# Patient Record
Sex: FEMALE | Race: WHITE | NOT HISPANIC OR LATINO | Employment: UNEMPLOYED | URBAN - METROPOLITAN AREA
[De-identification: names, ages, dates, MRNs, and addresses within clinical notes are randomized per-mention and may not be internally consistent; named-entity substitution may affect disease eponyms.]

---

## 2021-03-23 ENCOUNTER — HOSPITAL ENCOUNTER (EMERGENCY)
Facility: MEDICAL CENTER | Age: 61
End: 2021-03-23
Attending: EMERGENCY MEDICINE

## 2021-03-23 ENCOUNTER — APPOINTMENT (OUTPATIENT)
Dept: RADIOLOGY | Facility: MEDICAL CENTER | Age: 61
End: 2021-03-23
Attending: EMERGENCY MEDICINE

## 2021-03-23 VITALS
HEIGHT: 64 IN | SYSTOLIC BLOOD PRESSURE: 138 MMHG | WEIGHT: 128.31 LBS | BODY MASS INDEX: 21.91 KG/M2 | HEART RATE: 66 BPM | RESPIRATION RATE: 17 BRPM | TEMPERATURE: 97.9 F | DIASTOLIC BLOOD PRESSURE: 74 MMHG | OXYGEN SATURATION: 98 %

## 2021-03-23 DIAGNOSIS — K83.8 COMMON BILE DUCT DILATATION: ICD-10-CM

## 2021-03-23 DIAGNOSIS — R10.12 ABDOMINAL PAIN, BILATERAL UPPER QUADRANT: ICD-10-CM

## 2021-03-23 DIAGNOSIS — R10.11 ABDOMINAL PAIN, BILATERAL UPPER QUADRANT: ICD-10-CM

## 2021-03-23 LAB
ALBUMIN SERPL BCP-MCNC: 4.3 G/DL (ref 3.2–4.9)
ALBUMIN/GLOB SERPL: 1.5 G/DL
ALP SERPL-CCNC: 43 U/L (ref 30–99)
ALT SERPL-CCNC: 18 U/L (ref 2–50)
ANION GAP SERPL CALC-SCNC: 10 MMOL/L (ref 7–16)
APPEARANCE UR: CLEAR
AST SERPL-CCNC: 20 U/L (ref 12–45)
BASOPHILS # BLD AUTO: 0.5 % (ref 0–1.8)
BASOPHILS # BLD: 0.03 K/UL (ref 0–0.12)
BILIRUB SERPL-MCNC: 0.3 MG/DL (ref 0.1–1.5)
BILIRUB UR QL STRIP.AUTO: NEGATIVE
BUN SERPL-MCNC: 12 MG/DL (ref 8–22)
CALCIUM SERPL-MCNC: 10.7 MG/DL (ref 8.5–10.5)
CHLORIDE SERPL-SCNC: 103 MMOL/L (ref 96–112)
CO2 SERPL-SCNC: 24 MMOL/L (ref 20–33)
COLOR UR: YELLOW
CREAT SERPL-MCNC: 0.7 MG/DL (ref 0.5–1.4)
EKG IMPRESSION: NORMAL
EOSINOPHIL # BLD AUTO: 0.03 K/UL (ref 0–0.51)
EOSINOPHIL NFR BLD: 0.5 % (ref 0–6.9)
ERYTHROCYTE [DISTWIDTH] IN BLOOD BY AUTOMATED COUNT: 41.8 FL (ref 35.9–50)
GLOBULIN SER CALC-MCNC: 2.8 G/DL (ref 1.9–3.5)
GLUCOSE SERPL-MCNC: 106 MG/DL (ref 65–99)
GLUCOSE UR STRIP.AUTO-MCNC: NEGATIVE MG/DL
HCT VFR BLD AUTO: 45.5 % (ref 37–47)
HGB BLD-MCNC: 15.9 G/DL (ref 12–16)
IMM GRANULOCYTES # BLD AUTO: 0.02 K/UL (ref 0–0.11)
IMM GRANULOCYTES NFR BLD AUTO: 0.3 % (ref 0–0.9)
KETONES UR STRIP.AUTO-MCNC: NEGATIVE MG/DL
LACTATE BLD-SCNC: 1 MMOL/L (ref 0.5–2)
LEUKOCYTE ESTERASE UR QL STRIP.AUTO: NEGATIVE
LIPASE SERPL-CCNC: 31 U/L (ref 11–82)
LYMPHOCYTES # BLD AUTO: 1.84 K/UL (ref 1–4.8)
LYMPHOCYTES NFR BLD: 29.4 % (ref 22–41)
MCH RBC QN AUTO: 33.4 PG (ref 27–33)
MCHC RBC AUTO-ENTMCNC: 34.9 G/DL (ref 33.6–35)
MCV RBC AUTO: 95.6 FL (ref 81.4–97.8)
MICRO URNS: ABNORMAL
MONOCYTES # BLD AUTO: 0.58 K/UL (ref 0–0.85)
MONOCYTES NFR BLD AUTO: 9.3 % (ref 0–13.4)
NEUTROPHILS # BLD AUTO: 3.76 K/UL (ref 2–7.15)
NEUTROPHILS NFR BLD: 60 % (ref 44–72)
NITRITE UR QL STRIP.AUTO: NEGATIVE
NRBC # BLD AUTO: 0 K/UL
NRBC BLD-RTO: 0 /100 WBC
PH UR STRIP.AUTO: >=9 [PH] (ref 5–8)
PLATELET # BLD AUTO: 296 K/UL (ref 164–446)
PMV BLD AUTO: 10.1 FL (ref 9–12.9)
POTASSIUM SERPL-SCNC: 3.6 MMOL/L (ref 3.6–5.5)
PROT SERPL-MCNC: 7.1 G/DL (ref 6–8.2)
PROT UR QL STRIP: NEGATIVE MG/DL
RBC # BLD AUTO: 4.76 M/UL (ref 4.2–5.4)
RBC UR QL AUTO: NEGATIVE
SODIUM SERPL-SCNC: 137 MMOL/L (ref 135–145)
SP GR UR STRIP.AUTO: 1.01
UROBILINOGEN UR STRIP.AUTO-MCNC: 0.2 MG/DL
WBC # BLD AUTO: 6.3 K/UL (ref 4.8–10.8)

## 2021-03-23 PROCEDURE — 93005 ELECTROCARDIOGRAM TRACING: CPT | Performed by: EMERGENCY MEDICINE

## 2021-03-23 PROCEDURE — 83690 ASSAY OF LIPASE: CPT

## 2021-03-23 PROCEDURE — C9113 INJ PANTOPRAZOLE SODIUM, VIA: HCPCS | Performed by: EMERGENCY MEDICINE

## 2021-03-23 PROCEDURE — 700111 HCHG RX REV CODE 636 W/ 250 OVERRIDE (IP): Performed by: EMERGENCY MEDICINE

## 2021-03-23 PROCEDURE — 96374 THER/PROPH/DIAG INJ IV PUSH: CPT

## 2021-03-23 PROCEDURE — 700117 HCHG RX CONTRAST REV CODE 255: Performed by: EMERGENCY MEDICINE

## 2021-03-23 PROCEDURE — 99285 EMERGENCY DEPT VISIT HI MDM: CPT

## 2021-03-23 PROCEDURE — 83605 ASSAY OF LACTIC ACID: CPT

## 2021-03-23 PROCEDURE — 85025 COMPLETE CBC W/AUTO DIFF WBC: CPT

## 2021-03-23 PROCEDURE — 74177 CT ABD & PELVIS W/CONTRAST: CPT

## 2021-03-23 PROCEDURE — 80053 COMPREHEN METABOLIC PANEL: CPT

## 2021-03-23 PROCEDURE — 81003 URINALYSIS AUTO W/O SCOPE: CPT

## 2021-03-23 PROCEDURE — 36415 COLL VENOUS BLD VENIPUNCTURE: CPT

## 2021-03-23 RX ORDER — PANTOPRAZOLE SODIUM 20 MG/1
20 TABLET, DELAYED RELEASE ORAL DAILY
Qty: 30 TABLET | Refills: 1 | Status: SHIPPED | OUTPATIENT
Start: 2021-03-23 | End: 2022-10-06

## 2021-03-23 RX ORDER — IBUPROFEN 200 MG
200 TABLET ORAL EVERY 6 HOURS PRN
Status: SHIPPED | COMMUNITY
End: 2022-10-06

## 2021-03-23 RX ORDER — PANTOPRAZOLE SODIUM 40 MG/10ML
40 INJECTION, POWDER, LYOPHILIZED, FOR SOLUTION INTRAVENOUS ONCE
Status: COMPLETED | OUTPATIENT
Start: 2021-03-23 | End: 2021-03-23

## 2021-03-23 RX ADMIN — IOHEXOL 100 ML: 350 INJECTION, SOLUTION INTRAVENOUS at 19:05

## 2021-03-23 RX ADMIN — PANTOPRAZOLE SODIUM 40 MG: 40 INJECTION, POWDER, FOR SOLUTION INTRAVENOUS at 20:19

## 2021-03-23 NOTE — ED TRIAGE NOTES
"Bert Duncan   60 y.o. female   Patient presents with:  Chief Complaint   Patient presents with   • Abdominal Pain   • Back Pain     Pt wheel chaired to triage for above complaint.     Patient has had ABD and back pain for approx 6 months.  Pain comes and goes, and with progressively worse.  Food triggers symptoms as well as laying down.  Pain described as \"fire or heart burn in stomach and into my chest\"  \"It feels like everything is tight and my back is gonna break\"     Pt is alert and oriented, speaking in full sentences, follows commands and responds appropriately to questions. NAD. Resp are even and unlabored.     Pt placed in waiting room. Pt educated on triage process. Pt encouraged to alert staff for any changes.    Patient and staff wearing appropriate PPE    /70   Pulse 61   Temp 36.6 °C (97.9 °F) (Temporal)   Resp 16   Ht 1.626 m (5' 4\")   Wt 58.2 kg (128 lb 4.9 oz)   SpO2 98%   BMI 22.02 kg/m²     "

## 2021-03-24 NOTE — ED PROVIDER NOTES
ED Provider Note    ED Provider Note    Scribed for Fran Robles MD by Fran Robles M.D.. 3/23/2021, 6:12 PM.    Primary care provider: No primary care provider on file.  Means of arrival: Private  History obtained from: Patient  History limited by: None    CHIEF COMPLAINT  Chief Complaint   Patient presents with   • Abdominal Pain   • Back Pain       HPI  Bert Duncan is a 60 y.o. female who presents to the Emergency Department for evaluation of abdominal discomfort.  Patient admits this has been going on chronically, at least the last 6 months.  However has been a lot worse for the last 4 weeks.  She notes pain is burning in character, localized to the bilateral upper quadrants with radiation to the back.  She notes it seems worse at night and worse after she eats a meal.  No particular foods seem to affect things differently.  She notes occasional nausea and rare emesis but no vomiting today.  No dysuria, no hematuria, no fever.  She has no diarrhea, and no constipation.  She admits she does not regularly see a doctor because symptoms were getting worse she decided was time to be assessed.  She used to drink alcohol but stopped doing so in January, she does smoke marijuana daily basis but notes no cigarette use for quite some time.    REVIEW OF SYSTEMS  Pertinent positives include upper abdominal pain radiation to the back more so with eating. Pertinent negatives include no fever, no lower abdominal pain, no dyspnea.  All other systems reviewed and negative.    PAST MEDICAL HISTORY   Marijuana use, former tobacco and former alcohol use    SURGICAL HISTORY   has a past surgical history that includes gyn surgery and tonsillectomy.    SOCIAL HISTORY  Social History     Tobacco Use   • Smoking status: Never Smoker   • Smokeless tobacco: Never Used   Substance Use Topics   • Alcohol use: Never   • Drug use: Yes     Types: Inhaled     Comment: marijuana      Social History     Substance and  "Sexual Activity   Drug Use Yes   • Types: Inhaled    Comment: marijuana       FAMILY HISTORY  Family History   Family history unknown: Yes       CURRENT MEDICATIONS  Home Medications     Reviewed by Messi Hoang R.N. (Registered Nurse) on 03/23/21 at 1643  Med List Status: Partial   Medication Last Dose Status   ibuprofen (MOTRIN) 200 MG Tab 3/23/2021 Active                ALLERGIES  No Known Allergies    PHYSICAL EXAM  VITAL SIGNS: /74   Pulse 66   Temp 36.6 °C (97.9 °F) (Temporal)   Resp 17   Ht 1.626 m (5' 4\")   Wt 58.2 kg (128 lb 4.9 oz)   SpO2 98%   BMI 22.02 kg/m²     General: Alert, no acute distress  Skin: Warm, dry, normal for ethnicity  Head: Normocephalic, atraumatic  Neck: Trachea midline, no tenderness  Eye: PERRL, normal conjunctiva, sclera anicteric.  ENMT: Oral mucosa moist, no pharyngeal erythema or exudate  Cardiovascular: Regular rate and rhythm, No murmur, Normal peripheral perfusion  Respiratory: Lungs CTA, respirations are non-labored, breath sounds are equal  Gastrointestinal: Soft, nontender, non distended  Musculoskeletal: No swelling, no deformity.  Paraspinous and midline tenderness approximating the lower thoracic region, T10-T12, no step-off.  No CVA tenderness.  Neurological: Alert and oriented to person, place, time, and situation  Lymphatics: No lymphadenopathy  Psychiatric: Cooperative, appropriate mood & affect      DIAGNOSTIC STUDIES/PROCEDURES    LABS  Results for orders placed or performed during the hospital encounter of 03/23/21   CBC WITH DIFFERENTIAL   Result Value Ref Range    WBC 6.3 4.8 - 10.8 K/uL    RBC 4.76 4.20 - 5.40 M/uL    Hemoglobin 15.9 12.0 - 16.0 g/dL    Hematocrit 45.5 37.0 - 47.0 %    MCV 95.6 81.4 - 97.8 fL    MCH 33.4 (H) 27.0 - 33.0 pg    MCHC 34.9 33.6 - 35.0 g/dL    RDW 41.8 35.9 - 50.0 fL    Platelet Count 296 164 - 446 K/uL    MPV 10.1 9.0 - 12.9 fL    Neutrophils-Polys 60.00 44.00 - 72.00 %    Lymphocytes 29.40 22.00 - " 41.00 %    Monocytes 9.30 0.00 - 13.40 %    Eosinophils 0.50 0.00 - 6.90 %    Basophils 0.50 0.00 - 1.80 %    Immature Granulocytes 0.30 0.00 - 0.90 %    Nucleated RBC 0.00 /100 WBC    Neutrophils (Absolute) 3.76 2.00 - 7.15 K/uL    Lymphs (Absolute) 1.84 1.00 - 4.80 K/uL    Monos (Absolute) 0.58 0.00 - 0.85 K/uL    Eos (Absolute) 0.03 0.00 - 0.51 K/uL    Baso (Absolute) 0.03 0.00 - 0.12 K/uL    Immature Granulocytes (abs) 0.02 0.00 - 0.11 K/uL    NRBC (Absolute) 0.00 K/uL   COMP METABOLIC PANEL   Result Value Ref Range    Sodium 137 135 - 145 mmol/L    Potassium 3.6 3.6 - 5.5 mmol/L    Chloride 103 96 - 112 mmol/L    Co2 24 20 - 33 mmol/L    Anion Gap 10.0 7.0 - 16.0    Glucose 106 (H) 65 - 99 mg/dL    Bun 12 8 - 22 mg/dL    Creatinine 0.70 0.50 - 1.40 mg/dL    Calcium 10.7 (H) 8.5 - 10.5 mg/dL    AST(SGOT) 20 12 - 45 U/L    ALT(SGPT) 18 2 - 50 U/L    Alkaline Phosphatase 43 30 - 99 U/L    Total Bilirubin 0.3 0.1 - 1.5 mg/dL    Albumin 4.3 3.2 - 4.9 g/dL    Total Protein 7.1 6.0 - 8.2 g/dL    Globulin 2.8 1.9 - 3.5 g/dL    A-G Ratio 1.5 g/dL   LIPASE   Result Value Ref Range    Lipase 31 11 - 82 U/L   URINALYSIS    Specimen: Urine   Result Value Ref Range    Color Yellow     Character Clear     Specific Gravity 1.012 <1.035    Ph >=9.0 (A) 5.0 - 8.0    Glucose Negative Negative mg/dL    Ketones Negative Negative mg/dL    Protein Negative Negative mg/dL    Bilirubin Negative Negative    Urobilinogen, Urine 0.2 Negative    Nitrite Negative Negative    Leukocyte Esterase Negative Negative    Occult Blood Negative Negative    Micro Urine Req see below    ESTIMATED GFR   Result Value Ref Range    GFR If African American >60 >60 mL/min/1.73 m 2    GFR If Non African American >60 >60 mL/min/1.73 m 2   LACTIC ACID   Result Value Ref Range    Lactic Acid 1.0 0.5 - 2.0 mmol/L   EKG (NOW)   Result Value Ref Range    Report       [facility] Emergency Dept.    Test Date:  2021-03-23  Pt Name:    PLACIDO MARIE                  Department: ER  MRN:        [id number]                      Room:       [facility]  Gender:     Female                       Technician: [id number]  :        1960                   Requested By:KVNG COLE  Order #:    [id number]                    Reading MD:    Measurements  Intervals                                Axis  Rate:       67                           P:          -6  NV:         135                          QRS:        89  QRSD:       94                           T:          10  QT:         432  QTc:        456    Interpretive Statements  SINUS RHYTHM  BORDERLINE RIGHT AXIS DEVIATION  No previous ECG available for comparison       All labs reviewed by me.    EKG  12 Lead EKG obtained at 1939 and interpreted by me to show:  Rhythm: Normal sinus rhythm   Rate: 67  Axis: Normal  Intervals: Normal  Q Waves: Normal  No diagnostic ST segment elevation    Clinical Impression: Normal EKG  Compared to none available    RADIOLOGY  CT-ABDOMEN-PELVIS WITH   Final Result      Intrahepatic and extra hepatic biliary duct dilatation without obvious pancreas mass could be due to occult mass or perhaps stricture. Consider further evaluation with pancreas MRI or endoscopic ultrasound.      Aortic atherosclerosis without aneurysm.        The radiologist's interpretation of all radiological studies have been reviewed by me.    COURSE & MEDICAL DECISION MAKING  Pertinent Labs & Imaging studies reviewed. (See chart for details)    6:12 PM - Patient seen and examined at bedside. Patient declines analgesia initially. Ordered metabolic work-up as well as CT imaging abdomen pelvis to evaluate her symptoms. The differential diagnoses include but are not limited to: Gastritis, peptic ulcer disease, symptomatic cholelithiasis    : Patient reassessed, in no carolina distress at this time.  I have updated with labs thus far, we are awaiting CT imaging for further evaluation.    : Patient reassessed, in no  "acute distress, relieved here of otherwise unremarkable studies.  Given dilated intrahepatic and naturopathic biliary duct I do recommend close follow-up with gastroenterology.  There is no evidence on the CT of mass but I did note to her this is a consideration.  Given chronicity of symptoms she is comfortable with outpatient follow-up.    Patient Vitals for the past 24 hrs:   BP Temp Temp src Pulse Resp SpO2 Height Weight   03/23/21 2004 138/74 -- -- 66 17 98 % -- --   03/23/21 1641 -- -- -- -- -- -- 1.626 m (5' 4\") 58.2 kg (128 lb 4.9 oz)   03/23/21 1620 132/70 36.6 °C (97.9 °F) Temporal 61 16 98 % 1.626 m (5' 4\") --         Decision Making:  This is a 60 y.o. year old female who presents with abdominal pain for several months.  She notes it has been worse for the last 4 weeks.  She is actually nontender and well-appearing on my exam, there is some mild tenderness to the back where she notes the pain seems to radiate but she has no CVA tenderness.  Unclear etiology of symptoms, given her age and risk factors including former alcohol and former tobacco use I do feel CT imaging is indicated to evaluate further.  CT thankfully is unremarkable, labs are unremarkable.  There is evidence of intra and extrahepatic biliary duct dilatation but no carolina mass noted on CT.  Radiology recommends further evaluation.  No indication for inpatient management, as such she will be referred to GI to be evaluated further.  Otherwise I suspect perhaps peptic ulcer disease, have treated with a written for a proton pump inhibitor.    The patient will return for new or worsening symptoms and is stable at the time of discharge.    Patient has had high blood pressure while in the emergency department, felt likely secondary to medical condition. Counseled patient to monitor blood pressure at home and follow up with primary care physician.     DISPOSITION:  Patient will be discharged home in stable condition.    FOLLOW UP:  [facility] - [address] [id number] [phone]  Schedule an appointment as soon as possible for a visit         OUTPATIENT MEDICATIONS:  Discharge Medication List as of 3/23/2021  8:12 PM      START taking these medications    Details   pantoprazole (PROTONIX) 20 MG tablet Take 1 tablet by mouth every day., Disp-30 tablet, R-1, Print Rx Paper               FINAL IMPRESSION  1. Abdominal pain, bilateral upper quadrant    2. Common bile duct dilatation          Fran FRANK M.D. (Ning), am scribing for, and in the presence of, Fran Robles MD.    Electronically signed by: Fran Robles M.D. (Ning), 3/23/2021    IFran MD personally performed the services described in this documentation, as scribed by Fran Robles M.D. in my presence, and it is both accurate and complete    The note accurately reflects work and decisions made by me.  Fran Robles M.D.  3/23/2021  8:30 PM

## 2021-03-24 NOTE — ED NOTES
Pt given discharge instructions, iv removed. Given prescription for Protonix. Pt ambulatory to [facility].

## 2022-10-06 ENCOUNTER — HOSPITAL ENCOUNTER (EMERGENCY)
Facility: MEDICAL CENTER | Age: 62
End: 2022-10-06
Attending: EMERGENCY MEDICINE

## 2022-10-06 ENCOUNTER — OFFICE VISIT (OUTPATIENT)
Dept: URGENT CARE | Facility: CLINIC | Age: 62
End: 2022-10-06

## 2022-10-06 ENCOUNTER — APPOINTMENT (OUTPATIENT)
Dept: RADIOLOGY | Facility: MEDICAL CENTER | Age: 62
End: 2022-10-06
Attending: EMERGENCY MEDICINE

## 2022-10-06 ENCOUNTER — APPOINTMENT (OUTPATIENT)
Dept: RADIOLOGY | Facility: MEDICAL CENTER | Age: 62
End: 2022-10-06

## 2022-10-06 VITALS
TEMPERATURE: 97.7 F | HEART RATE: 72 BPM | HEIGHT: 64 IN | DIASTOLIC BLOOD PRESSURE: 63 MMHG | WEIGHT: 130 LBS | RESPIRATION RATE: 16 BRPM | OXYGEN SATURATION: 95 % | SYSTOLIC BLOOD PRESSURE: 162 MMHG | BODY MASS INDEX: 22.2 KG/M2

## 2022-10-06 VITALS
WEIGHT: 131.6 LBS | TEMPERATURE: 98.2 F | HEART RATE: 76 BPM | RESPIRATION RATE: 16 BRPM | HEIGHT: 64 IN | DIASTOLIC BLOOD PRESSURE: 70 MMHG | SYSTOLIC BLOOD PRESSURE: 100 MMHG | OXYGEN SATURATION: 97 % | BODY MASS INDEX: 22.47 KG/M2

## 2022-10-06 DIAGNOSIS — R06.02 SOB (SHORTNESS OF BREATH): ICD-10-CM

## 2022-10-06 DIAGNOSIS — U07.1 COVID-19: ICD-10-CM

## 2022-10-06 DIAGNOSIS — I48.91 ATRIAL FIBRILLATION, UNSPECIFIED TYPE (HCC): ICD-10-CM

## 2022-10-06 DIAGNOSIS — F41.9 ANXIETY: ICD-10-CM

## 2022-10-06 LAB
ALBUMIN SERPL BCP-MCNC: 4.6 G/DL (ref 3.2–4.9)
ALBUMIN/GLOB SERPL: 1.6 G/DL
ALP SERPL-CCNC: 67 U/L (ref 30–99)
ALT SERPL-CCNC: 32 U/L (ref 2–50)
ANION GAP SERPL CALC-SCNC: 17 MMOL/L (ref 7–16)
AST SERPL-CCNC: 60 U/L (ref 12–45)
BASOPHILS # BLD AUTO: 0.4 % (ref 0–1.8)
BASOPHILS # BLD: 0.02 K/UL (ref 0–0.12)
BILIRUB SERPL-MCNC: 0.3 MG/DL (ref 0.1–1.5)
BUN SERPL-MCNC: 14 MG/DL (ref 8–22)
CALCIUM SERPL-MCNC: 9.2 MG/DL (ref 8.5–10.5)
CHLORIDE SERPL-SCNC: 98 MMOL/L (ref 96–112)
CO2 SERPL-SCNC: 18 MMOL/L (ref 20–33)
CREAT SERPL-MCNC: 0.84 MG/DL (ref 0.5–1.4)
D DIMER PPP IA.FEU-MCNC: 0.71 UG/ML (FEU) (ref 0–0.5)
EKG IMPRESSION: NORMAL
EOSINOPHIL # BLD AUTO: 0 K/UL (ref 0–0.51)
EOSINOPHIL NFR BLD: 0 % (ref 0–6.9)
ERYTHROCYTE [DISTWIDTH] IN BLOOD BY AUTOMATED COUNT: 45.1 FL (ref 35.9–50)
FLUAV RNA SPEC QL NAA+PROBE: NEGATIVE
FLUBV RNA SPEC QL NAA+PROBE: NEGATIVE
GFR SERPLBLD CREATININE-BSD FMLA CKD-EPI: 78 ML/MIN/1.73 M 2
GLOBULIN SER CALC-MCNC: 2.8 G/DL (ref 1.9–3.5)
GLUCOSE SERPL-MCNC: 129 MG/DL (ref 65–99)
HCT VFR BLD AUTO: 48.4 % (ref 37–47)
HGB BLD-MCNC: 18 G/DL (ref 12–16)
IMM GRANULOCYTES # BLD AUTO: 0.01 K/UL (ref 0–0.11)
IMM GRANULOCYTES NFR BLD AUTO: 0.2 % (ref 0–0.9)
LYMPHOCYTES # BLD AUTO: 2.67 K/UL (ref 1–4.8)
LYMPHOCYTES NFR BLD: 51.9 % (ref 22–41)
MCH RBC QN AUTO: 34.1 PG (ref 27–33)
MCHC RBC AUTO-ENTMCNC: 37.2 G/DL (ref 33.6–35)
MCV RBC AUTO: 91.7 FL (ref 81.4–97.8)
MONOCYTES # BLD AUTO: 0.87 K/UL (ref 0–0.85)
MONOCYTES NFR BLD AUTO: 16.9 % (ref 0–13.4)
NEUTROPHILS # BLD AUTO: 1.57 K/UL (ref 2–7.15)
NEUTROPHILS NFR BLD: 30.6 % (ref 44–72)
NRBC # BLD AUTO: 0 K/UL
NRBC BLD-RTO: 0 /100 WBC
PLATELET # BLD AUTO: 221 K/UL (ref 164–446)
PMV BLD AUTO: 9.5 FL (ref 9–12.9)
POTASSIUM SERPL-SCNC: 4.3 MMOL/L (ref 3.6–5.5)
PROT SERPL-MCNC: 7.4 G/DL (ref 6–8.2)
RBC # BLD AUTO: 5.28 M/UL (ref 4.2–5.4)
RSV RNA SPEC QL NAA+PROBE: NEGATIVE
SARS-COV-2 RNA RESP QL NAA+PROBE: DETECTED
SODIUM SERPL-SCNC: 133 MMOL/L (ref 135–145)
SPECIMEN SOURCE: ABNORMAL
T4 FREE SERPL-MCNC: 1.05 NG/DL (ref 0.93–1.7)
TROPONIN T SERPL-MCNC: 8 NG/L (ref 6–19)
TSH SERPL DL<=0.005 MIU/L-ACNC: 27.1 UIU/ML (ref 0.38–5.33)
WBC # BLD AUTO: 5.1 K/UL (ref 4.8–10.8)

## 2022-10-06 PROCEDURE — C9803 HOPD COVID-19 SPEC COLLECT: HCPCS | Performed by: EMERGENCY MEDICINE

## 2022-10-06 PROCEDURE — 99285 EMERGENCY DEPT VISIT HI MDM: CPT

## 2022-10-06 PROCEDURE — 84439 ASSAY OF FREE THYROXINE: CPT

## 2022-10-06 PROCEDURE — 85025 COMPLETE CBC W/AUTO DIFF WBC: CPT

## 2022-10-06 PROCEDURE — 700111 HCHG RX REV CODE 636 W/ 250 OVERRIDE (IP)

## 2022-10-06 PROCEDURE — 93005 ELECTROCARDIOGRAM TRACING: CPT | Performed by: EMERGENCY MEDICINE

## 2022-10-06 PROCEDURE — 71045 X-RAY EXAM CHEST 1 VIEW: CPT

## 2022-10-06 PROCEDURE — 85379 FIBRIN DEGRADATION QUANT: CPT

## 2022-10-06 PROCEDURE — 36415 COLL VENOUS BLD VENIPUNCTURE: CPT

## 2022-10-06 PROCEDURE — 71275 CT ANGIOGRAPHY CHEST: CPT

## 2022-10-06 PROCEDURE — 700117 HCHG RX CONTRAST REV CODE 255: Performed by: EMERGENCY MEDICINE

## 2022-10-06 PROCEDURE — 84484 ASSAY OF TROPONIN QUANT: CPT

## 2022-10-06 PROCEDURE — 84443 ASSAY THYROID STIM HORMONE: CPT

## 2022-10-06 PROCEDURE — 96374 THER/PROPH/DIAG INJ IV PUSH: CPT

## 2022-10-06 PROCEDURE — 0241U HCHG SARS-COV-2 COVID-19 NFCT DS RESP RNA 4 TRGT MIC: CPT

## 2022-10-06 PROCEDURE — 80053 COMPREHEN METABOLIC PANEL: CPT

## 2022-10-06 PROCEDURE — 99205 OFFICE O/P NEW HI 60 MIN: CPT | Performed by: NURSE PRACTITIONER

## 2022-10-06 PROCEDURE — 94760 N-INVAS EAR/PLS OXIMETRY 1: CPT

## 2022-10-06 PROCEDURE — 93000 ELECTROCARDIOGRAM COMPLETE: CPT | Performed by: NURSE PRACTITIONER

## 2022-10-06 PROCEDURE — 93005 ELECTROCARDIOGRAM TRACING: CPT

## 2022-10-06 RX ORDER — LORAZEPAM 2 MG/ML
INJECTION INTRAMUSCULAR
Status: COMPLETED
Start: 2022-10-06 | End: 2022-10-06

## 2022-10-06 RX ORDER — LORAZEPAM 0.5 MG/1
0.5 TABLET ORAL EVERY 8 HOURS PRN
Qty: 6 TABLET | Refills: 0 | Status: SHIPPED | OUTPATIENT
Start: 2022-10-06 | End: 2022-10-09

## 2022-10-06 RX ORDER — LORAZEPAM 2 MG/ML
0.5 INJECTION INTRAMUSCULAR ONCE
Status: COMPLETED | OUTPATIENT
Start: 2022-10-06 | End: 2022-10-06

## 2022-10-06 RX ORDER — LORAZEPAM 0.5 MG/1
0.5 TABLET ORAL EVERY 8 HOURS PRN
Qty: 6 TABLET | Refills: 0 | Status: SHIPPED | OUTPATIENT
Start: 2022-10-06 | End: 2022-10-06 | Stop reason: SDUPTHER

## 2022-10-06 RX ADMIN — LORAZEPAM 0.5 MG: 2 INJECTION INTRAMUSCULAR; INTRAVENOUS at 20:36

## 2022-10-06 RX ADMIN — LORAZEPAM 0.5 MG: 2 INJECTION INTRAMUSCULAR at 20:36

## 2022-10-06 RX ADMIN — IOHEXOL 45 ML: 350 INJECTION, SOLUTION INTRAVENOUS at 22:25

## 2022-10-06 ASSESSMENT — ENCOUNTER SYMPTOMS
DIZZINESS: 0
NAUSEA: 0
WEAKNESS: 1
PND: 0
COUGH: 1
VOMITING: 0
SPUTUM PRODUCTION: 0
MYALGIAS: 0
EYE REDNESS: 0
FEVER: 0
SORE THROAT: 0
CHILLS: 1
PALPITATIONS: 1
SHORTNESS OF BREATH: 1

## 2022-10-06 ASSESSMENT — FIBROSIS 4 INDEX
FIB4 SCORE: 0.99
FIB4 SCORE: 0.99

## 2022-10-07 NOTE — PROGRESS NOTES
Subjective:   Bert Duncan is a 62 y.o. female who presents for Cough (X 6 days )      HPI  Patient is a 62-year-old female presents the urgent care for evaluation of a cough that has been persistent for the past 6 days.  Patient states that she did take an at-home COVID test which was negative.  Patient's daughter and significant other also experiencing similar symptoms.  She denies any fever, chills.  Cough is nonproductive.  She has been experiencing notable amount of shortness of breath and weakness.  Patient also feeling anxious due to her symptoms.  She does not take any prescribed medications.  She does have a history of an arrhythmia.  Has been having intermittent palpitations over the past 2 days with mild chest discomfort.  She denies chest pain at this time.    Review of Systems   Constitutional:  Positive for chills and malaise/fatigue. Negative for fever.   HENT:  Negative for sore throat.    Eyes:  Negative for redness.   Respiratory:  Positive for cough and shortness of breath. Negative for sputum production.    Cardiovascular:  Positive for chest pain and palpitations. Negative for leg swelling and PND.   Gastrointestinal:  Negative for nausea and vomiting.   Genitourinary:  Negative for dysuria.   Musculoskeletal:  Negative for myalgias.   Skin:  Negative for rash.   Neurological:  Positive for weakness. Negative for dizziness.     Medications:    This patient does not have an active medication from one of the medication groupers.    Allergies: Patient has no known allergies.    Problem List: Bert Duncan does not have a problem list on file.    Surgical History:  Past Surgical History:   Procedure Laterality Date    GYN SURGERY      TONSILLECTOMY         Past Social Hx: Bert Duncan  reports that she has never smoked. She has never used smokeless tobacco. She reports current drug use. Drug: Inhaled. She reports that she does not drink alcohol.     Past Family Hx:  Bert Terrazas  "Dakota Family history is unknown by patient.     Problem list, medications, and allergies reviewed by myself today in Epic.     Objective:     /70   Pulse 76   Temp 36.8 °C (98.2 °F) (Temporal)   Resp 16   Ht 1.626 m (5' 4\")   Wt 59.7 kg (131 lb 9.6 oz)   SpO2 97%   BMI 22.59 kg/m²     Physical Exam  Vitals and nursing note reviewed.   Constitutional:       General: She is not in acute distress.     Appearance: She is well-developed.   HENT:      Head: Normocephalic and atraumatic.      Right Ear: External ear normal.      Left Ear: External ear normal.      Nose: Nose normal.      Mouth/Throat:      Mouth: Mucous membranes are moist.   Eyes:      Conjunctiva/sclera: Conjunctivae normal.   Cardiovascular:      Rate and Rhythm: Tachycardia present. Rhythm irregular.      Heart sounds: Normal heart sounds and S2 normal.   Pulmonary:      Effort: Pulmonary effort is normal. Tachypnea present. No respiratory distress.      Breath sounds: Normal breath sounds.   Abdominal:      General: There is no distension.   Musculoskeletal:         General: Normal range of motion.   Skin:     General: Skin is warm and dry.   Neurological:      General: No focal deficit present.      Mental Status: She is alert and oriented to person, place, and time. Mental status is at baseline.      Gait: Gait (gait at baseline) normal.   Psychiatric:         Mood and Affect: Mood is anxious.         Judgment: Judgment normal.       Assessment/Plan:     Diagnosis and associated orders:     1. SOB (shortness of breath)  EKG - Clinic Performed      2. Atrial fibrillation, unspecified type (HCC)             Comments/MDM:     Ekg: Atrial Fibrillation /Flutter rate 112      At this time, I feel the patient requires a higher level of care including closer monitoring, stat lab work and/or imaging for further evaluation. this has been discussed with the patient and they state agreement and understanding.  I offered the patient an ambulance " ride and  the patient is declining at this time. The patient is in no acute distress upon clinic departure and will go directly to ED without delay.                Please note that this dictation was created using voice recognition software. I have made a reasonable attempt to correct obvious errors, but I expect that there are errors of grammar and possibly content that I did not discover before finalizing the note.    This note was electronically signed by Tito ROSAS

## 2022-10-07 NOTE — ED NOTES
Pt started hyperventilating, unable to catch her breath, pts o2 98%, pt states she was having chest discomfort, erp paged, pt given 0.5 mg ativan

## 2022-10-07 NOTE — ED PROVIDER NOTES
"ED Provider Note    CHIEF COMPLAINT  Chief Complaint   Patient presents with    Palpitations    Shortness of Breath     Pt presents with shortness of breath and palpitations x 6 days. Pt also reports to nausea and no other symptoms.        HPI  Bert Duncan is a 62 y.o. female who presents to the emergency department with approximately 1 week of increasing and persistent shortness of breath as well as palpitations.  Past medical history reportedly benign.  She was exposed to her sister-in-law which she had a viral URI syndrome.  That person has since resolved but she is now been feeling under the weather for this last week.  Today her shortness of breath seemingly got worse and therefore she decided to come to the emergency department.  Denies drugs or alcohol.  Is on thyroid supplementation.  Believes that her last thyroid check was approximately 6 months ago.  She did travel to [facility] approximately 1 month ago but was feeling well after returning.    REVIEW OF SYSTEMS  See HPI for further details. All other systems are negative.     PAST MEDICAL HISTORY       SOCIAL HISTORY  Social History     Tobacco Use    Smoking status: Never    Smokeless tobacco: Never   Vaping Use    Vaping Use: Never used   Substance and Sexual Activity    Alcohol use: Never    Drug use: Yes     Types: Inhaled     Comment: marijuana    Sexual activity: Not on file       SURGICAL HISTORY   has a past surgical history that includes gyn surgery and tonsillectomy.    CURRENT MEDICATIONS  Home Medications       Reviewed by Darryl Lyon R.N. (Registered Nurse) on 10/06/22 at 2303  Med List Status: Complete     Medication Last Dose Status        Patient Jaylen Taking any Medications                           ALLERGIES  No Known Allergies    PHYSICAL EXAM  VITAL SIGNS: BP (!) 162/63   Pulse 72   Temp 36.5 °C (97.7 °F)   Resp 16   Ht 1.626 m (5' 4\")   Wt 59 kg (130 lb)   SpO2 95%   BMI 22.31 kg/m²  @[id number]::@   Pulse ox " interpretation: I interpret this pulse ox as normal.  Constitutional: Alert in no apparent distress.  Anxious  HENT: No signs of trauma, Bilateral external ears normal, Nose normal.   Eyes: Pupils are equal and reactive  Neck: Normal range of motion, No tenderness, Supple  Cardiovascular: Tachycardic regular rate and rhythm, no murmurs.   Thorax & Lungs: Normal breath sounds, tachypneic  Abdomen: Bowel sounds normal, Soft, No tenderness  Skin: Warm, Dry, No erythema, No rash.   Extremities: Intact distal pulses  Musculoskeletal: Good range of motion in all major joints. No tenderness to palpation or major deformities noted.   Neurologic: Alert , Normal motor function, Normal sensory function, No focal deficits noted.   Psychiatric: Affect is anxious, Judgment normal, Mood normal.       DIAGNOSTIC STUDIES / PROCEDURES      LABS  Results for orders placed or performed during the hospital encounter of 10/06/22   CBC with Differential   Result Value Ref Range    WBC 5.1 4.8 - 10.8 K/uL    RBC 5.28 4.20 - 5.40 M/uL    Hemoglobin 18.0 (H) 12.0 - 16.0 g/dL    Hematocrit 48.4 (H) 37.0 - 47.0 %    MCV 91.7 81.4 - 97.8 fL    MCH 34.1 (H) 27.0 - 33.0 pg    MCHC 37.2 (H) 33.6 - 35.0 g/dL    RDW 45.1 35.9 - 50.0 fL    Platelet Count 221 164 - 446 K/uL    MPV 9.5 9.0 - 12.9 fL    Neutrophils-Polys 30.60 (L) 44.00 - 72.00 %    Lymphocytes 51.90 (H) 22.00 - 41.00 %    Monocytes 16.90 (H) 0.00 - 13.40 %    Eosinophils 0.00 0.00 - 6.90 %    Basophils 0.40 0.00 - 1.80 %    Immature Granulocytes 0.20 0.00 - 0.90 %    Nucleated RBC 0.00 /100 WBC    Neutrophils (Absolute) 1.57 (L) 2.00 - 7.15 K/uL    Lymphs (Absolute) 2.67 1.00 - 4.80 K/uL    Monos (Absolute) 0.87 (H) 0.00 - 0.85 K/uL    Eos (Absolute) 0.00 0.00 - 0.51 K/uL    Baso (Absolute) 0.02 0.00 - 0.12 K/uL    Immature Granulocytes (abs) 0.01 0.00 - 0.11 K/uL    NRBC (Absolute) 0.00 K/uL   Complete Metabolic Panel (CMP)   Result Value Ref Range    Sodium 133 (L) 135 - 145 mmol/L     Potassium 4.3 3.6 - 5.5 mmol/L    Chloride 98 96 - 112 mmol/L    Co2 18 (L) 20 - 33 mmol/L    Anion Gap 17.0 (H) 7.0 - 16.0    Glucose 129 (H) 65 - 99 mg/dL    Bun 14 8 - 22 mg/dL    Creatinine 0.84 0.50 - 1.40 mg/dL    Calcium 9.2 8.5 - 10.5 mg/dL    AST(SGOT) 60 (H) 12 - 45 U/L    ALT(SGPT) 32 2 - 50 U/L    Alkaline Phosphatase 67 30 - 99 U/L    Total Bilirubin 0.3 0.1 - 1.5 mg/dL    Albumin 4.6 3.2 - 4.9 g/dL    Total Protein 7.4 6.0 - 8.2 g/dL    Globulin 2.8 1.9 - 3.5 g/dL    A-G Ratio 1.6 g/dL   Troponin   Result Value Ref Range    Troponin T 8 6 - 19 ng/L   D-DIMER   Result Value Ref Range    D-Dimer Screen 0.71 (H) 0.00 - 0.50 ug/mL (FEU)   TSH WITH REFLEX TO FT4   Result Value Ref Range    TSH 27.100 (H) 0.380 - 5.330 uIU/mL   COV-2, FLU A/B, AND RSV BY PCR (2-4 HOURS CEPHEID): Collect NP swab in VTM    Specimen: Respirate   Result Value Ref Range    Influenza virus A RNA Negative Negative    Influenza virus B, PCR Negative Negative    RSV, PCR Negative Negative    SARS-CoV-2 by PCR DETECTED (AA)     SARS-CoV-2 Source NP Swab    ESTIMATED GFR   Result Value Ref Range    GFR (CKD-EPI) 78 >60 mL/min/1.73 m 2   FREE THYROXINE   Result Value Ref Range    Free T-4 1.05 0.93 - 1.70 ng/dL   EKG   Result Value Ref Range    Report       [facility] Emergency [facility].    Test Date:  2022-10-06  Pt Name:    PLACIDO MARIE                 Department: ER  MRN:        [id number]                      Room:  Gender:     Female                       Technician: [id number]  :        [id number]                   Requested By:ER TRIAGE PROTOCOL  Order #:    [id number]                    Reading MD: Dale Kaur    Measurements  Intervals                                Axis  Rate:       125                          P:  DC:                                      QRS:        -85  QRSD:       90                           T:          49  QT:         341  QTc:        492    Interpretive Statements  Atrial fibrillation  LAD,  consider left anterior fascicular block  RSR' in V1 or V2, right VCD or RVH  Minimal ST elevation, lateral leads  Compared to ECG 03/23/2021 19:39:10  Right ventricular hypertrophy now present  RSR' in V1 or V2 now present  ST (T wave) deviation now present  Sinus rhythm no longer present  Electronically Sign ed On 10-6-2022 22:47:51 PDT by Dale Kaur           RADIOLOGY  CT-CTA CHEST PULMONARY ARTERY W/ RECONS   Final Result         1.  No pulmonary embolus appreciated.   2.  Reflux of contrast into the hepatic veins, appearance suggests central venous congestion.      DX-CHEST-PORTABLE (1 VIEW)   Final Result         1.  No acute cardiopulmonary disease.   2.  Trace bilateral pleural effusions            COURSE & MEDICAL DECISION MAKING  Pertinent Labs & Imaging studies reviewed. (See chart for details)  62-year-old female presented the emergency department with the above presentation.  Upon my initial interview to the room after being called by nursing staff urgently/emergently to the room the patient was hyperventilating and appearing quite anxious.  After placing a nonrebreather over her face not connected to oxygen she started to feel better and again I believe this is likely secondary to her hyperventilatory syndrome.  At the end of today's evaluation she is found to have COVID.  Again I believe this is likely driving her feeling of severe dyspnea.  She has however not hypoxic and not requiring oxygen supplementation formally.  At this point she will be discharged home with ongoing home COVID care understood.  Furthermore she is understanding of return precautions should she have any changes or worsening in the interim.  She is also understanding of CDC guidelines for quarantine at this point.      The patient will return for worsening symptoms and is stable at the time of discharge. The patient verbalizes understanding and will comply.    FINAL IMPRESSION  1. COVID-19    2. Anxiety             Electronically signed by: Dale Kaur M.D., 10/6/2022 9:28 PM

## 2022-10-07 NOTE — ED TRIAGE NOTES
"Bert Duncan  62 y.o.    Chief Complaint   Patient presents with    Palpitations    Shortness of Breath     Pt presents with shortness of breath and palpitations x 6 days. Pt also reports to nausea and no other symptoms.        Blood Pressure: (!) 145/91, Pulse: (!) 133, Respiration: (!) 24, Temperature: 36.5 °C (97.7 °F), Height: 162.6 cm (5' 4\"), Weight: 59 kg (130 lb), BMI (Calculated): 22.31, BSA (Calculated): 1.6, Pulse Oximetry: 99 % (RA)    Charge RN notified of patient, pt roomed immediately to [facility].   "

## 2022-10-07 NOTE — ED NOTES
Pt resting in bed, talking with  at bedside, no signs of distress noted, will continue to monitor

## 2022-10-07 NOTE — ED NOTES
(Break RN) Discharge teaching and paperwork provided regarding COVID-19 and all questions/concerns answered. VSS,  assessment stable and PIV removed. Given information regarding home care and reasons to follow up with ED or primary MD. Patient provided with ativan Rx.

## 2025-02-03 ENCOUNTER — PATIENT MESSAGE (OUTPATIENT)
Dept: HEALTH INFORMATION MANAGEMENT | Facility: OTHER | Age: 65
End: 2025-02-03